# Patient Record
Sex: FEMALE | Race: WHITE | Employment: FULL TIME | URBAN - METROPOLITAN AREA
[De-identification: names, ages, dates, MRNs, and addresses within clinical notes are randomized per-mention and may not be internally consistent; named-entity substitution may affect disease eponyms.]

---
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
## 2022-02-21 ENCOUNTER — HOSPITAL ENCOUNTER (EMERGENCY)
Age: 22
Discharge: HOME | End: 2022-02-21
Attending: EMERGENCY MEDICINE
Payer: COMMERCIAL

## 2022-02-21 VITALS
BODY MASS INDEX: 47.65 KG/M2 | HEART RATE: 83 BPM | SYSTOLIC BLOOD PRESSURE: 195 MMHG | WEIGHT: 286 LBS | TEMPERATURE: 98.6 F | RESPIRATION RATE: 18 BRPM | OXYGEN SATURATION: 99 % | DIASTOLIC BLOOD PRESSURE: 108 MMHG | HEIGHT: 65 IN

## 2022-02-21 DIAGNOSIS — K08.89 PAIN, DENTAL: Primary | ICD-10-CM

## 2022-02-21 PROCEDURE — 96372 THER/PROPH/DIAG INJ SC/IM: CPT

## 2022-02-21 PROCEDURE — 6370000000 HC RX 637 (ALT 250 FOR IP): Performed by: EMERGENCY MEDICINE

## 2022-02-21 PROCEDURE — 6360000002 HC RX W HCPCS: Performed by: EMERGENCY MEDICINE

## 2022-02-21 PROCEDURE — 99284 EMERGENCY DEPT VISIT MOD MDM: CPT

## 2022-02-21 RX ORDER — CHLORHEXIDINE GLUCONATE 0.12 MG/ML
15 RINSE ORAL 2 TIMES DAILY
Qty: 420 ML | Refills: 0 | Status: SHIPPED | OUTPATIENT
Start: 2022-02-21 | End: 2022-03-07

## 2022-02-21 RX ORDER — PENICILLIN V POTASSIUM 500 MG/1
500 TABLET ORAL 4 TIMES DAILY
Qty: 28 TABLET | Refills: 0 | Status: SHIPPED | OUTPATIENT
Start: 2022-02-21 | End: 2022-02-28

## 2022-02-21 RX ORDER — NAPROXEN 500 MG/1
500 TABLET ORAL 2 TIMES DAILY WITH MEALS
Qty: 60 TABLET | Refills: 5 | Status: SHIPPED | OUTPATIENT
Start: 2022-02-21

## 2022-02-21 RX ORDER — KETOROLAC TROMETHAMINE 30 MG/ML
30 INJECTION, SOLUTION INTRAMUSCULAR; INTRAVENOUS ONCE
Status: COMPLETED | OUTPATIENT
Start: 2022-02-21 | End: 2022-02-21

## 2022-02-21 RX ORDER — PENICILLIN V POTASSIUM 250 MG/1
500 TABLET ORAL ONCE
Status: COMPLETED | OUTPATIENT
Start: 2022-02-21 | End: 2022-02-21

## 2022-02-21 RX ADMIN — KETOROLAC TROMETHAMINE 30 MG: 30 INJECTION, SOLUTION INTRAMUSCULAR at 04:54

## 2022-02-21 RX ADMIN — PENICILLIN V POTASSIUM 500 MG: 250 TABLET, FILM COATED ORAL at 04:54

## 2022-02-21 ASSESSMENT — PAIN - FUNCTIONAL ASSESSMENT: PAIN_FUNCTIONAL_ASSESSMENT: 0-10

## 2022-02-21 ASSESSMENT — PAIN DESCRIPTION - ORIENTATION: ORIENTATION: LEFT;LOWER

## 2022-02-21 ASSESSMENT — PAIN DESCRIPTION - DESCRIPTORS: DESCRIPTORS: SHARP;THROBBING

## 2022-02-21 ASSESSMENT — PAIN SCALES - GENERAL
PAINLEVEL_OUTOF10: 8
PAINLEVEL_OUTOF10: 8

## 2022-02-21 ASSESSMENT — PAIN DESCRIPTION - LOCATION: LOCATION: TEETH

## 2022-02-21 NOTE — ED NOTES
Discharge instructions given. Verbalized understanding. Denies further questions or concerns. A&OX4. Ambulates from ED with steady gait.       Christal Cabrera RN  02/21/22 0630

## 2022-02-23 ASSESSMENT — ENCOUNTER SYMPTOMS
FACIAL SWELLING: 0
TROUBLE SWALLOWING: 0
VOICE CHANGE: 0
SORE THROAT: 0

## 2022-02-23 NOTE — ED PROVIDER NOTES
[address] [facility]  ED  [facility]      Pt Name: Dg Hunter  MRN: [id number]  [place] 2000  Date ofevaluation: 2/21/2022  Provider: Viola Warren MD    CHIEF COMPLAINT       Chief Complaint   Patient presents with    Dental Pain     x 3 days. Left bottom molar broken x 1 year. Pain got worse 3 days ago. HISTORY OF PRESENT ILLNESS   (Location/Symptom, Timing/Onset,Context/Setting, Quality, Duration, Modifying Factors, Severity)  Note limiting factors. Dg Hunter is a 22 y.o. female  who  has a past medical history of Hypertension, MRSA (methicillin resistant staph aureus) culture positive, and Thyroid disease. who presents to the emergency department for evaluation of pain to the left posterior mandible. Patient reports she has a history of a previously fractured tooth. She states that she will intermittently get dental pain but has been having persistent pain for the past 3 days. Denies facial swelling fevers difficulties breathing or swallowing. Denies nausea or vomiting. Reports she is not currently following with a dentist.  She tried over-the-counter occasions without improvement. HPI    NursingNotes were reviewed. REVIEW OF SYSTEMS    (2-9 systems for level 4, 10 or more for level 5)     Review of Systems   Constitutional: Negative for fever. HENT: Positive for dental problem. Negative for congestion, facial swelling, sore throat, trouble swallowing and voice change. Cardiovascular: Negative for chest pain. Except as noted above the remainder of the review of systems was reviewed and negative.        PAST MEDICAL HISTORY     Past Medical History:   Diagnosis Date    Hypertension     MRSA (methicillin resistant staph aureus) culture positive 12/30/13    lt thigh abscess    Thyroid disease          SURGICALHISTORY       Past Surgical History:   Procedure Laterality Date    TONSILLECTOMY           CURRENT MEDICATIONS       Discharge Medication List as of 2/21/2022  4:58 AM               Patient has no known allergies. FAMILY HISTORY     History reviewed. No pertinent family history. SOCIAL HISTORY       Social History     Socioeconomic History    Marital status: Single     Spouse name: None    Number of children: None    Years of education: None    Highest education level: None   Occupational History    None   Tobacco Use    Smoking status: Never Smoker    Smokeless tobacco: Never Used   Vaping Use    Vaping Use: None   Substance and Sexual Activity    Alcohol use: No    Drug use: No    Sexual activity: Never   Other Topics Concern    None   Social History Narrative    None     Social Determinants of Health     Financial Resource Strain:     Difficulty of Paying Living Expenses: Not on file   Food Insecurity:     Worried About Running Out of Food in the Last Year: Not on file    Iliana of Food in the Last Year: Not on file   Transportation Needs:     Lack of Transportation (Medical): Not on file    Lack of Transportation (Non-Medical):  Not on file   Physical Activity:     Days of Exercise per Week: Not on file    Minutes of Exercise per Session: Not on file   Stress:     Feeling of Stress : Not on file   Social Connections:     Frequency of Communication with Friends and Family: Not on file    Frequency of Social Gatherings with Friends and Family: Not on file    Attends Latter day Services: Not on file    Active Member of [address] [place] [address] [employer] or Organizations: Not on file    Attends Club or Organization Meetings: Not on file    Marital Status: Not on file   Intimate Partner Violence:     Fear of Current or Ex-Partner: Not on file    Emotionally Abused: Not on file    Physically Abused: Not on file    Sexually Abused: Not on file   Housing Stability:     Unable to Pay for Housing in the Last Year: Not on file    Number of [place] in the Last Year: Not on file    Unstable Housing in the Last Year: Not on file SCREENINGS    Cherelle Coma Scale  Eye Opening: Spontaneous  Best Verbal Response: Oriented  Best Motor Response: Obeys commands  Cherelle Coma Scale Score: 15        PHYSICAL EXAM    (up to 7 for level 4, 8 or more for level 5)     ED Triage Vitals [02/21/22 0430]   BP Temp Temp Source Pulse Resp SpO2 Height Weight   (!) 195/108 98.6 °F (37 °C) Oral 83 18 99 % 5' 5\" (1.651 m) 286 lb (129.7 kg)       Physical Exam  Vitals and nursing note reviewed. Constitutional:       Appearance: She is well-developed. HENT:      Head: Normocephalic and atraumatic. Mouth/Throat:      Comments: Multiple decaying teeth without signs of abscess. No signs of tonsillar or peritonsillar swelling. No submandibular pain. Eyes:      Conjunctiva/sclera: Conjunctivae normal.      Pupils: Pupils are equal, round, and reactive to light. Neck:      Trachea: No tracheal deviation. Cardiovascular:      Rate and Rhythm: Normal rate and regular rhythm. Heart sounds: Normal heart sounds. Pulmonary:      Effort: Pulmonary effort is normal.      Breath sounds: Normal breath sounds. Abdominal:      General: There is no distension. Palpations: Abdomen is soft. Tenderness: There is no abdominal tenderness. Musculoskeletal:         General: Normal range of motion. Cervical back: Normal range of motion. Skin:     General: Skin is warm and dry. Capillary Refill: Capillary refill takes less than 2 seconds. Neurological:      Mental Status: She is alert and oriented to person, place, and time.          RESULTS     EKG: All EKG's are interpreted by the Emergency Department Physician who either signs or Co-signsthis chart in the absence of a cardiologist.      RADIOLOGY:   Non-plain filmimages such as CT, Ultrasound and MRI are read by the radiologist. Plain radiographic images are visualized and preliminarily interpreted by the emergency physician with the below findings:        Interpretation per the Radiologist below, if available at the time ofthis note:    No orders to display         ED BEDSIDE ULTRASOUND:   Performed by ED Physician - none    LABS:  Labs Reviewed - No data to display    All other labs were within normal range or not returned as of this dictation. EMERGENCY DEPARTMENT COURSE and DIFFERENTIAL DIAGNOSIS/MDM:   Vitals:    Vitals:    02/21/22 0430   BP: (!) 195/108   Pulse: 83   Resp: 18   Temp: 98.6 °F (37 °C)   TempSrc: Oral   SpO2: 99%   Weight: 286 lb (129.7 kg)   Height: 5' 5\" (1.651 m)       Patient was given thefollowing medications:  Medications   ketorolac (TORADOL) injection 30 mg (30 mg IntraMUSCular Given 2/21/22 0454)   penicillin v potassium (VEETID) tablet 500 mg (500 mg Oral Given 2/21/22 0454)       ED COURSE & MEDICAL DECISION MAKING    Pertinent Labs & Imaging studies reviewed. (See chart for details)   -  Patient seen and evaluated in the emergency department. -  Triage and nursing notes reviewed and incorporated. -  Old chart records reviewed and incorporated. -  Differential diagnosis includes: Differential Diagnosis: Dental Abscess, Airway Obstruction, Tom's Angina, Bacteremia/Sepsis. -  Work-up included:  See above  -  ED treatment included: See above  -  Results discussed with patient. Patient presents the ED for evaluation of dental pain. On exam no signs of abscess or facial swelling. No overlying erythema. No signs concerning for Tom angina or impending airway compromise. Patient provided with analgesics and antibiotics. Patient feels improved on reevaluation. Symptomatic treatment with expectant management discussed with the patient and they and/or family members present are amenable to treatment plan and outpatient follow-up. Strict return precautions were discussed with the patient and those present. They demonstrated understanding of when to return to the emergency department for new or worsening symptoms. .  The patient is agreeable with plan of care and disposition. REASSESSMENT          CRITICAL CARE TIME   Total Critical Care time was 0 minutes, excluding separately reportable procedures. There was a high probability of clinically significant/life threatening deterioration in the patient's condition which required my urgent intervention. CONSULTS:  None    PROCEDURES:  Unless otherwise noted below, none     Procedures    FINAL IMPRESSION      1. Pain, dental          DISPOSITION/PLAN   DISPOSITION Decision To Discharge 02/21/2022 04:58:52 AM      PATIENT REFERREDTO:  [address] [place],[place] [address]  Schedule an appointment as soon as possible for a visit today  As needed      DISCHARGEMEDICATIONS:  Discharge Medication List as of 2/21/2022  4:58 AM      START taking these medications    Details   naproxen (NAPROSYN) 500 MG tablet Take 1 tablet by mouth 2 times daily (with meals), Disp-60 tablet, R-5Print      chlorhexidine (PERIDEX) 0.12 % solution Take 15 mLs by mouth 2 times daily for 14 days, Disp-420 mL, R-0Print      benzocaine (ORAJEL) 10 % mucosal gel Take by mouth as needed. , Disp-9 g, R-0, Print      penicillin v potassium (VEETID) 500 MG tablet Take 1 tablet by mouth 4 times daily for 7 days, Disp-28 tablet, R-0Print                (Please note that portions of this note were completed with a voice recognition program.  Efforts were made to edit the dictations but occasionally words are mis-transcribed.)    Mina Benitez MD (electronically signed)  Attending Emergency Physician          Mina Benitez MD  02/23/22 6849

## 2022-03-16 ENCOUNTER — HOSPITAL ENCOUNTER (EMERGENCY)
Age: 22
Discharge: HOME | End: 2022-03-16
Attending: EMERGENCY MEDICINE
Payer: COMMERCIAL

## 2022-03-16 VITALS
BODY MASS INDEX: 47.82 KG/M2 | RESPIRATION RATE: 20 BRPM | TEMPERATURE: 98.9 F | WEIGHT: 287 LBS | OXYGEN SATURATION: 100 % | HEART RATE: 64 BPM | SYSTOLIC BLOOD PRESSURE: 180 MMHG | DIASTOLIC BLOOD PRESSURE: 90 MMHG | HEIGHT: 65 IN

## 2022-03-16 DIAGNOSIS — I10 UNCONTROLLED HYPERTENSION: ICD-10-CM

## 2022-03-16 DIAGNOSIS — K08.89 PAIN, DENTAL: Primary | ICD-10-CM

## 2022-03-16 PROCEDURE — 6370000000 HC RX 637 (ALT 250 FOR IP): Performed by: EMERGENCY MEDICINE

## 2022-03-16 PROCEDURE — 99283 EMERGENCY DEPT VISIT LOW MDM: CPT

## 2022-03-16 RX ORDER — LIDOCAINE HYDROCHLORIDE 20 MG/ML
15 SOLUTION OROPHARYNGEAL ONCE
Status: COMPLETED | OUTPATIENT
Start: 2022-03-16 | End: 2022-03-16

## 2022-03-16 RX ORDER — HYDROCODONE BITARTRATE AND ACETAMINOPHEN 5; 325 MG/1; MG/1
1 TABLET ORAL ONCE
Status: COMPLETED | OUTPATIENT
Start: 2022-03-16 | End: 2022-03-16

## 2022-03-16 RX ORDER — HYDROCODONE BITARTRATE AND ACETAMINOPHEN 5; 325 MG/1; MG/1
1 TABLET ORAL EVERY 6 HOURS PRN
Qty: 10 TABLET | Refills: 0 | Status: SHIPPED | OUTPATIENT
Start: 2022-03-16 | End: 2022-03-19

## 2022-03-16 RX ADMIN — HYDROCODONE BITARTRATE AND ACETAMINOPHEN 1 TABLET: 5; 325 TABLET ORAL at 03:02

## 2022-03-16 RX ADMIN — LIDOCAINE HYDROCHLORIDE 15 ML: 20 SOLUTION ORAL; TOPICAL at 03:02

## 2022-03-16 ASSESSMENT — PAIN - FUNCTIONAL ASSESSMENT: PAIN_FUNCTIONAL_ASSESSMENT: 0-10

## 2022-03-16 ASSESSMENT — ENCOUNTER SYMPTOMS
FACIAL SWELLING: 0
TROUBLE SWALLOWING: 0

## 2022-03-16 ASSESSMENT — PAIN SCALES - GENERAL: PAINLEVEL_OUTOF10: 7

## 2022-03-16 ASSESSMENT — PAIN DESCRIPTION - PAIN TYPE: TYPE: ACUTE PAIN

## 2022-03-16 ASSESSMENT — PAIN DESCRIPTION - LOCATION: LOCATION: TEETH

## 2022-03-16 NOTE — ED PROVIDER NOTES
[address] [facility]  ED  EMERGENCY DEPARTMENT ENCOUNTER      Pt Name: Dinh Waldron  MRN: [id number]  [place] 2000  Date of evaluation: 3/16/2022  Provider: Cory Serna MD    [address]       Chief Complaint   Patient presents with    Dental Pain     broken filling (6 months ago); pain 10/10; reports unable to sleep due to pain         HISTORY OF PRESENT ILLNESS   (Location/Symptom, Timing/Onset,Context/Setting, Quality, Duration, Modifying Factors, Severity)  Note limiting factors. Dinh Waldron is a 22 y.o. female who presents to the emergency department for dental pain, filling in tooth fell out. Went to dentist recently and they made it worse by chipping the tooth. Naproxen is not helping, took tylenol and that is also not helping, recently completed pcn in Feb.         Nursing notes were reviewed. REVIEW OF SYSTEMS    (2-9 systems for level 4, 10 or more for level 5)     Review of Systems   Constitutional: Negative for fever. HENT: Negative for facial swelling and trouble swallowing. PAST MEDICAL HISTORY     Past Medical History:   Diagnosis Date    Hypertension     MRSA (methicillin resistant staph aureus) culture positive 12/30/13    lt thigh abscess    Thyroid disease          SURGICALHISTORY       Past Surgical History:   Procedure Laterality Date    TONSILLECTOMY           CURRENT MEDICATIONS       Discharge Medication List as of 3/16/2022  3:02 AM      CONTINUE these medications which have NOT CHANGED    Details   naproxen (NAPROSYN) 500 MG tablet Take 1 tablet by mouth 2 times daily (with meals), Disp-60 tablet, R-5Print      benzocaine (ORAJEL) 10 % mucosal gel Take by mouth as needed. , Disp-9 g, R-0, Print             ALLERGIES     Patient has no known allergies. FAMILY HISTORY     History reviewed. No pertinent family history.        SOCIAL HISTORY       Social History     Socioeconomic History    Marital status: Single     Spouse name: None    Number of children: None    Years of education: None    Highest education level: None   Occupational History    None   Tobacco Use    Smoking status: Never Smoker    Smokeless tobacco: Never Used   Vaping Use    Vaping Use: None   Substance and Sexual Activity    Alcohol use: No    Drug use: No    Sexual activity: Never   Other Topics Concern    None   Social History Narrative    None     Social Determinants of Health     Financial Resource Strain:     Difficulty of Paying Living Expenses: Not on file   Food Insecurity:     Worried About Running Out of Food in the Last Year: Not on file    Iliana of Food in the Last Year: Not on file   Transportation Needs:     Lack of Transportation (Medical): Not on file    Lack of Transportation (Non-Medical):  Not on file   Physical Activity:     Days of Exercise per Week: Not on file    Minutes of Exercise per Session: Not on file   Stress:     Feeling of Stress : Not on file   Social Connections:     Frequency of Communication with Friends and Family: Not on file    Frequency of Social Gatherings with Friends and Family: Not on file    Attends Alevism Services: Not on file    Active Member of [address] [place] [address] or Organizations: Not on file    Attends Club or Organization Meetings: Not on file    Marital Status: Not on file   Intimate Partner Violence:     Fear of Current or Ex-Partner: Not on file    Emotionally Abused: Not on file    Physically Abused: Not on file    Sexually Abused: Not on file   Housing Stability:     Unable to Pay for Housing in the Last Year: Not on file    Number of [place] in the Last Year: Not on file    Unstable Housing in the Last Year: Not on file       SCREENINGS    [place] Coma Scale  Eye Opening: Spontaneous  Best Verbal Response: Oriented  Best Motor Response: Obeys commands  [place] Coma Scale Score: 15        PHYSICAL EXAM    (up to 7 for level 4, 8 or more for level 5)     ED Triage Vitals [03/16/22 0235]   BP Temp Temp src Pulse Resp SpO2 Height Weight   (!) 180/90 98.9 °F (37.2 °C) -- 64 20 100 % 5' 5\" (1.651 m) 287 lb (130.2 kg)       Physical Exam  Vitals and nursing note reviewed. Constitutional:       Appearance: Normal appearance. She is well-developed. She is not ill-appearing. HENT:      Head: Normocephalic and atraumatic. Right Ear: External ear normal.      Left Ear: External ear normal.      Nose: Nose normal.      Mouth/Throat:      Mouth: Mucous membranes are moist.      Comments: Significant plaque buildup with signs of gingivitis. The right lower jaw she has a missing tooth in the molar behind this appears with partial filling and a hole. No signs of surrounding infection. The area is tender. No acute focal collection. She has no swelling to the jaw or the neck. Her tongue is not elevated. Eyes:      General: No scleral icterus. Right eye: No discharge. Left eye: No discharge. Conjunctiva/sclera: Conjunctivae normal.   Cardiovascular:      Rate and Rhythm: Normal rate. Pulmonary:      Effort: Pulmonary effort is normal. No respiratory distress. Musculoskeletal:      Cervical back: Neck supple. Lymphadenopathy:      Cervical: No cervical adenopathy. Skin:     Coloration: Skin is not pale. Neurological:      Mental Status: She is alert.    Psychiatric:         Mood and Affect: Mood normal.         Behavior: Behavior normal.         DIAGNOSTIC RESULTS     EKG: All EKG's are interpreted by the Emergency Department Physician who either signs or Co-signs this chart in the absence of a cardiologist.    12 lead EKG shows     RADIOLOGY:   Non-plain film images such as CT, Ultrasound and MRI are read by the radiologist. Plain radiographic images are visualized and preliminarily interpreted by the emergency physician with the below findings:        Interpretation per the Radiologist below, if available at the time of this note:    No orders to display         ED BEDSIDE ULTRASOUND:   Performed by ED Physician - none    LABS:  Labs Reviewed - No data to display    All other labs were within normal range or not returned as of this dictation. EMERGENCY DEPARTMENT COURSE and DIFFERENTIAL DIAGNOSIS/MDM:   Vitals:    Vitals:    03/16/22 0235   BP: (!) 180/90   Pulse: 64   Resp: 20   Temp: 98.9 °F (37.2 °C)   SpO2: 100%   Weight: 287 lb (130.2 kg)   Height: 5' 5\" (1.651 m)       Adult female who comes in for dental pain. She does have a tooth it appears that to be broken. Patient is given Norco here and viscous lidocaine is applied topically to the gum. Patient's blood pressure is significantly and her chart is reviewed and this seems to be a trend. When I addresses the patient states that she has not taken blood pressure medicine for a while not because it was discontinued but she stopped seeing her primary care provider. She also has a history of hyperthyroidism and has not seen an endocrinologist.  Patient states that when she was on medication she felt depressed and suicidal she is not sure if it was because of the medicines or the prospect of being on medications for a long time. Patient is counseled I explained to her that the complications of untreated hyperthyroidism and hypertension in the long run would be more detrimental than being on medications. I recommend that she follows up with her primary care provider and a referral is given for new PCP in the event that she is unable to reestablish care. The patient expresses understanding. She has a dental appointment on Friday I encouraged her to keep that appointment. CRITICAL CARE TIME   None       CONSULTS:  None    PROCEDURES:       Procedures    FINAL IMPRESSION      1. Pain, dental    2.  Uncontrolled hypertension          DISPOSITION/PLAN   DISPOSITION Decision To Discharge 03/16/2022 03:01:44 AM      PATIENT REFERREDTO:  Denise Walker MD  [facility] [address]  [phone]    Schedule an appointment as soon as possible for a visit         Keep your appointment with your dentist on Friday        Katie Rahman DO  [address]  [phone]    Schedule an appointment as soon as possible for a visit   If unable to see your previous PCP      DISCHARGEMEDICATIONS:  Discharge Medication List as of 3/16/2022  3:02 AM      START taking these medications    Details   HYDROcodone-acetaminophen (NORCO) 5-325 MG per tablet Take 1 tablet by mouth every 6 hours as needed for Pain for up to 3 days. Intended supply: 3 days.  Take lowest dose possible to manage pain, Disp-10 tablet, R-0Print                (Please note that portions of this note were completed with a voice recognition program.  Efforts were made to edit the dictations but occasionally words are mis-transcribed.)    Magaly Crenshaw MD (electronically signed)  Attending Emergency Physician          Magaly Crenshaw MD  03/16/22 9311

## 2022-03-16 NOTE — ED NOTES
Discharge instructions explained. Patient verbalized understanding and denies any other concerns or complaints at this time. Patient vital signs stable and no acute signs or symptoms of distress noted at discharge. Patient deemed clinically stable. Patient d/c home with family.      Raman Riojas RN  03/16/22 0105

## 2022-03-16 NOTE — ED NOTES
Patient reports taking Tylenol PM prior to coming to ED and reports only made sleepy without pain relief.      Ethan Mcclain RN  03/16/22 7930

## 2022-08-01 ENCOUNTER — HOSPITAL ENCOUNTER (EMERGENCY)
Age: 22
Discharge: HOME | End: 2022-08-01
Attending: EMERGENCY MEDICINE
Payer: COMMERCIAL

## 2022-08-01 VITALS
SYSTOLIC BLOOD PRESSURE: 149 MMHG | RESPIRATION RATE: 18 BRPM | HEART RATE: 89 BPM | OXYGEN SATURATION: 100 % | DIASTOLIC BLOOD PRESSURE: 91 MMHG | TEMPERATURE: 97.8 F

## 2022-08-01 DIAGNOSIS — K08.89 PAIN, DENTAL: Primary | ICD-10-CM

## 2022-08-01 PROCEDURE — 6370000000 HC RX 637 (ALT 250 FOR IP): Performed by: EMERGENCY MEDICINE

## 2022-08-01 PROCEDURE — 99283 EMERGENCY DEPT VISIT LOW MDM: CPT

## 2022-08-01 RX ORDER — PENICILLIN V POTASSIUM 250 MG/1
500 TABLET ORAL ONCE
Status: COMPLETED | OUTPATIENT
Start: 2022-08-01 | End: 2022-08-01

## 2022-08-01 RX ORDER — LISINOPRIL 5 MG/1
5 TABLET ORAL DAILY
COMMUNITY

## 2022-08-01 RX ORDER — ACETAMINOPHEN 500 MG
1000 TABLET ORAL ONCE
Status: COMPLETED | OUTPATIENT
Start: 2022-08-01 | End: 2022-08-01

## 2022-08-01 RX ORDER — PENICILLIN V POTASSIUM 500 MG/1
500 TABLET ORAL 4 TIMES DAILY
Qty: 28 TABLET | Refills: 0 | Status: SHIPPED | OUTPATIENT
Start: 2022-08-01 | End: 2022-08-08

## 2022-08-01 RX ORDER — LEVOTHYROXINE SODIUM 0.2 MG/1
200 TABLET ORAL DAILY
COMMUNITY
Start: 2022-07-18

## 2022-08-01 RX ADMIN — ACETAMINOPHEN 1000 MG: 500 TABLET ORAL at 05:56

## 2022-08-01 RX ADMIN — PENICILLIN V POTASSIUM 500 MG: 250 TABLET, FILM COATED ORAL at 05:56

## 2022-08-01 ASSESSMENT — PAIN SCALES - GENERAL: PAINLEVEL_OUTOF10: 6

## 2022-08-01 NOTE — ED PROVIDER NOTES
Emergency Department Attending Note    Cande Mg MD    Date of ED VIsit: 8/1/2022    CHIEF COMPLAINT  Dental Pain (Patient c/o dental pain x5 days. Patient states that she has a broken tooth with an exposed nerve root. )      HISTORY OF PRESENT ILLNESS  Donell Roblero is a 25 y.o. female  With Vital signs of BP (!) 149/91   Pulse 89   Temp 97.8 °F (36.6 °C) (Oral)   Resp 18   SpO2 100%  who presents to the ED with a complaint of toe pain x5 days. Patient seen and evaluated in room 7. Patient is complaining of right mandibular molar dental pain for 5 days. She states is gotten worse over the the past 2 days Kasie Mccormick prompted her to come into the emergency department for evaluation. Furthermore she has never had her wisdom teeth removed and apparently her dentist said that she had to go to the hospital to get her teeth removed which did not make any sense to me other than to see an oral maxillofacial specialist for the tooth removal.  No fevers. She has pain with eating. .  No other complaints, modifying factors or associated symptoms. Patients Past medical history reviewed and listed below  Past Medical History:   Diagnosis Date    Hypertension     MRSA (methicillin resistant staph aureus) culture positive 12/30/13    lt thigh abscess    Thyroid disease      Past Surgical History:   Procedure Laterality Date    TONSILLECTOMY         I have reviewed the following from the nursing documentation. History reviewed. No pertinent family history.   Social History     Socioeconomic History    Marital status: Single     Spouse name: Not on file    Number of children: Not on file    Years of education: Not on file    Highest education level: Not on file   Occupational History    Not on file   Tobacco Use    Smoking status: Never    Smokeless tobacco: Never   Vaping Use    Vaping Use: Not on file   Substance and Sexual Activity    Alcohol use: No    Drug use: No    Sexual activity: Never   Other Topics Concern    Not on file   Social History Narrative    Not on file     Social Determinants of Health     Financial Resource Strain: Not on file   Food Insecurity: Not on file   Transportation Needs: Not on file   Physical Activity: Not on file   Stress: Not on file   Social Connections: Not on file   Intimate Partner Violence: Not on file   Housing Stability: Not on file     No current facility-administered medications for this encounter. Current Outpatient Medications   Medication Sig Dispense Refill    levothyroxine (SYNTHROID) 200 MCG tablet Take 200 mcg by mouth in the morning. lisinopril (PRINIVIL;ZESTRIL) 5 MG tablet Take 5 mg by mouth in the morning. naproxen (NAPROSYN) 500 MG tablet Take 1 tablet by mouth 2 times daily (with meals) 60 tablet 5    benzocaine (ORAJEL) 10 % mucosal gel Take by mouth as needed. 9 g 0     No Known Allergies    REVIEW OF SYSTEMS  10 systems reviewed, pertinent positives per HPI otherwise noted to be negative     PHYSICAL EXAM  BP (!) 149/91   Pulse 89   Temp 97.8 °F (36.6 °C) (Oral)   Resp 18   SpO2 100%   GENERAL APPEARANCE: Awake and alert. Cooperative. In no obvious distress. HEAD: Normocephalic. Atraumatic. EYES: PERRL. EOM's grossly intact. ENT: Mucous membranes are pink and moist.  No intraoral abscess, poor dentition. NECK: Supple. HEART: RRR. No murmurs. LUNGS: Respirations unlabored. CTAB. Good air exchange. ABDOMEN: Soft. Non-distended. Non-tender. No masses. No organomegaly. No guarding or rebound. EXTREMITIES: No peripheral edema. Moves all extremities equally. All extremities neurovascularly intact. SKIN: Warm and dry. No acute rashes. NEUROLOGICAL: Alert and oriented. Strength 5/5, sensation intact. Gait normal.   PSYCHIATRIC: Normal mood and affect. No HI or SI expressed to me.       RADIOLOGY    If acquired see below     EKG:     If acquired see below       ED COURSE/MDM    So the patient will be started on Pen-Vee K and given a prescription and told to follow-up with a dentist or with an oral maxillofacial surgeon to remedy the wisdom tooth problem which is I think the primary cause of her pain         The ED course and plan were reviewed and results discussed with the patient    The patient understood and agreed with the Discharge/transfer planning.     CLINICAL IMPRESSION and DISPOSITION    Bessie Islas was stable and diagnosed with dental pain    Patient was treated with Ricardo Belle MD  08/01/22 5731

## 2022-08-01 NOTE — DISCHARGE INSTRUCTIONS
You need to have those wisdom teeth taken care of by an oral maxillofacial surgeon. That will help with your pain. In the meantime we will give you an antibiotic to fight any infection and possibly reduce the pain as well. Make sure you complete the antibiotic prescribed.   And if the pain stops after that please make sure he is still go on to the dentist or the oral maxillofacial surgeon because it will only come back

## 2024-09-30 ENCOUNTER — APPOINTMENT (OUTPATIENT)
Dept: CT IMAGING | Age: 24
End: 2024-09-30

## 2024-09-30 ENCOUNTER — HOSPITAL ENCOUNTER (EMERGENCY)
Age: 24
Discharge: ANOTHER HOSPITAL | End: 2024-09-30
Attending: STUDENT IN AN ORGANIZED HEALTH CARE EDUCATION/TRAINING PROGRAM

## 2024-09-30 VITALS
HEIGHT: 65 IN | HEART RATE: 85 BPM | WEIGHT: 293 LBS | BODY MASS INDEX: 48.82 KG/M2 | DIASTOLIC BLOOD PRESSURE: 97 MMHG | SYSTOLIC BLOOD PRESSURE: 154 MMHG | RESPIRATION RATE: 16 BRPM | OXYGEN SATURATION: 96 % | TEMPERATURE: 98.7 F

## 2024-09-30 DIAGNOSIS — K08.89 PAIN, DENTAL: Primary | ICD-10-CM

## 2024-09-30 DIAGNOSIS — J38.7: ICD-10-CM

## 2024-09-30 LAB
ALBUMIN SERPL-MCNC: 4.1 G/DL (ref 3.4–5)
ALBUMIN/GLOB SERPL: 1.3 {RATIO} (ref 1.1–2.2)
ALP SERPL-CCNC: 71 U/L (ref 40–129)
ALT SERPL-CCNC: 14 U/L (ref 10–40)
ANION GAP SERPL CALCULATED.3IONS-SCNC: 9 MMOL/L (ref 3–16)
AST SERPL-CCNC: 11 U/L (ref 15–37)
BASOPHILS # BLD: 0 K/UL (ref 0–0.2)
BASOPHILS NFR BLD: 0.4 %
BILIRUB SERPL-MCNC: 0.4 MG/DL (ref 0–1)
BUN SERPL-MCNC: 9 MG/DL (ref 7–20)
CALCIUM SERPL-MCNC: 8.6 MG/DL (ref 8.3–10.6)
CHLORIDE SERPL-SCNC: 99 MMOL/L (ref 99–110)
CO2 SERPL-SCNC: 26 MMOL/L (ref 21–32)
CREAT SERPL-MCNC: 0.6 MG/DL (ref 0.6–1.1)
DEPRECATED RDW RBC AUTO: 15.1 % (ref 12.4–15.4)
EOSINOPHIL # BLD: 0 K/UL (ref 0–0.6)
EOSINOPHIL NFR BLD: 0.5 %
GFR SERPLBLD CREATININE-BSD FMLA CKD-EPI: >90 ML/MIN/{1.73_M2}
GLUCOSE SERPL-MCNC: 136 MG/DL (ref 70–99)
HCG SERPL QL: NEGATIVE
HCT VFR BLD AUTO: 37.5 % (ref 36–48)
HGB BLD-MCNC: 12.3 G/DL (ref 12–16)
LYMPHOCYTES # BLD: 1.4 K/UL (ref 1–5.1)
LYMPHOCYTES NFR BLD: 14.8 %
MCH RBC QN AUTO: 25.2 PG (ref 26–34)
MCHC RBC AUTO-ENTMCNC: 32.8 G/DL (ref 31–36)
MCV RBC AUTO: 76.6 FL (ref 80–100)
MONOCYTES # BLD: 0.4 K/UL (ref 0–1.3)
MONOCYTES NFR BLD: 4.5 %
NEUTROPHILS # BLD: 7.6 K/UL (ref 1.7–7.7)
NEUTROPHILS NFR BLD: 79.8 %
PLATELET # BLD AUTO: 287 K/UL (ref 135–450)
PMV BLD AUTO: 8 FL (ref 5–10.5)
POTASSIUM SERPL-SCNC: 3.7 MMOL/L (ref 3.5–5.1)
PROT SERPL-MCNC: 7.3 G/DL (ref 6.4–8.2)
RBC # BLD AUTO: 4.89 M/UL (ref 4–5.2)
S PYO AG THROAT QL: NEGATIVE
SODIUM SERPL-SCNC: 134 MMOL/L (ref 136–145)
WBC # BLD AUTO: 9.5 K/UL (ref 4–11)

## 2024-09-30 PROCEDURE — 6370000000 HC RX 637 (ALT 250 FOR IP): Performed by: STUDENT IN AN ORGANIZED HEALTH CARE EDUCATION/TRAINING PROGRAM

## 2024-09-30 PROCEDURE — 70487 CT MAXILLOFACIAL W/DYE: CPT

## 2024-09-30 PROCEDURE — 96372 THER/PROPH/DIAG INJ SC/IM: CPT

## 2024-09-30 PROCEDURE — 99285 EMERGENCY DEPT VISIT HI MDM: CPT

## 2024-09-30 PROCEDURE — 85025 COMPLETE CBC W/AUTO DIFF WBC: CPT

## 2024-09-30 PROCEDURE — 87040 BLOOD CULTURE FOR BACTERIA: CPT

## 2024-09-30 PROCEDURE — 87880 STREP A ASSAY W/OPTIC: CPT

## 2024-09-30 PROCEDURE — 64400 NJX AA&/STRD TRIGEMINAL NRV: CPT

## 2024-09-30 PROCEDURE — 36415 COLL VENOUS BLD VENIPUNCTURE: CPT

## 2024-09-30 PROCEDURE — 6360000004 HC RX CONTRAST MEDICATION: Performed by: STUDENT IN AN ORGANIZED HEALTH CARE EDUCATION/TRAINING PROGRAM

## 2024-09-30 PROCEDURE — 84703 CHORIONIC GONADOTROPIN ASSAY: CPT

## 2024-09-30 PROCEDURE — 2580000003 HC RX 258: Performed by: STUDENT IN AN ORGANIZED HEALTH CARE EDUCATION/TRAINING PROGRAM

## 2024-09-30 PROCEDURE — 6360000002 HC RX W HCPCS: Performed by: STUDENT IN AN ORGANIZED HEALTH CARE EDUCATION/TRAINING PROGRAM

## 2024-09-30 PROCEDURE — 96365 THER/PROPH/DIAG IV INF INIT: CPT

## 2024-09-30 PROCEDURE — 80053 COMPREHEN METABOLIC PANEL: CPT

## 2024-09-30 RX ORDER — KETOROLAC TROMETHAMINE 15 MG/ML
15 INJECTION, SOLUTION INTRAMUSCULAR; INTRAVENOUS ONCE
Status: COMPLETED | OUTPATIENT
Start: 2024-09-30 | End: 2024-09-30

## 2024-09-30 RX ORDER — OXYCODONE AND ACETAMINOPHEN 5; 325 MG/1; MG/1
1 TABLET ORAL ONCE
Status: COMPLETED | OUTPATIENT
Start: 2024-09-30 | End: 2024-09-30

## 2024-09-30 RX ORDER — IOPAMIDOL 755 MG/ML
75 INJECTION, SOLUTION INTRAVASCULAR
Status: COMPLETED | OUTPATIENT
Start: 2024-09-30 | End: 2024-09-30

## 2024-09-30 RX ORDER — SODIUM CHLORIDE, SODIUM LACTATE, POTASSIUM CHLORIDE, AND CALCIUM CHLORIDE .6; .31; .03; .02 G/100ML; G/100ML; G/100ML; G/100ML
1000 INJECTION, SOLUTION INTRAVENOUS ONCE
Status: COMPLETED | OUTPATIENT
Start: 2024-09-30 | End: 2024-09-30

## 2024-09-30 RX ORDER — DEXAMETHASONE 4 MG/1
6 TABLET ORAL ONCE
Status: COMPLETED | OUTPATIENT
Start: 2024-09-30 | End: 2024-09-30

## 2024-09-30 RX ORDER — PENICILLIN V POTASSIUM 500 MG/1
500 TABLET, FILM COATED ORAL 4 TIMES DAILY
Qty: 28 TABLET | Refills: 0 | Status: SHIPPED | OUTPATIENT
Start: 2024-09-30 | End: 2024-10-07

## 2024-09-30 RX ORDER — PENICILLIN V POTASSIUM 250 MG/1
500 TABLET, FILM COATED ORAL ONCE
Status: COMPLETED | OUTPATIENT
Start: 2024-09-30 | End: 2024-09-30

## 2024-09-30 RX ADMIN — AMPICILLIN SODIUM AND SULBACTAM SODIUM 3000 MG: 2; 1 INJECTION, POWDER, FOR SOLUTION INTRAMUSCULAR; INTRAVENOUS at 07:54

## 2024-09-30 RX ADMIN — SODIUM CHLORIDE, POTASSIUM CHLORIDE, SODIUM LACTATE AND CALCIUM CHLORIDE 1000 ML: 600; 310; 30; 20 INJECTION, SOLUTION INTRAVENOUS at 08:26

## 2024-09-30 RX ADMIN — PENICILLIN V POTASSIUM 500 MG: 250 TABLET, FILM COATED ORAL at 04:16

## 2024-09-30 RX ADMIN — DEXAMETHASONE 6 MG: 4 TABLET ORAL at 04:17

## 2024-09-30 RX ADMIN — KETOROLAC TROMETHAMINE 15 MG: 15 INJECTION, SOLUTION INTRAMUSCULAR; INTRAVENOUS at 04:18

## 2024-09-30 RX ADMIN — OXYCODONE HYDROCHLORIDE AND ACETAMINOPHEN 1 TABLET: 5; 325 TABLET ORAL at 04:17

## 2024-09-30 RX ADMIN — IOPAMIDOL 75 ML: 755 INJECTION, SOLUTION INTRAVENOUS at 06:56

## 2024-09-30 ASSESSMENT — PAIN SCALES - GENERAL: PAINLEVEL_OUTOF10: 6

## 2024-09-30 ASSESSMENT — LIFESTYLE VARIABLES
HOW OFTEN DO YOU HAVE A DRINK CONTAINING ALCOHOL: NEVER
HOW MANY STANDARD DRINKS CONTAINING ALCOHOL DO YOU HAVE ON A TYPICAL DAY: PATIENT DOES NOT DRINK

## 2024-09-30 ASSESSMENT — PAIN DESCRIPTION - FREQUENCY: FREQUENCY: CONTINUOUS

## 2024-09-30 ASSESSMENT — PAIN DESCRIPTION - PAIN TYPE: TYPE: ACUTE PAIN

## 2024-09-30 ASSESSMENT — PAIN DESCRIPTION - ORIENTATION: ORIENTATION: LEFT

## 2024-09-30 ASSESSMENT — PAIN DESCRIPTION - ONSET: ONSET: ON-GOING

## 2024-09-30 ASSESSMENT — PAIN - FUNCTIONAL ASSESSMENT
PAIN_FUNCTIONAL_ASSESSMENT: ACTIVITIES ARE NOT PREVENTED
PAIN_FUNCTIONAL_ASSESSMENT: 0-10

## 2024-09-30 ASSESSMENT — PAIN DESCRIPTION - DESCRIPTORS: DESCRIPTORS: THROBBING

## 2024-09-30 ASSESSMENT — PAIN DESCRIPTION - LOCATION: LOCATION: TEETH

## 2024-09-30 NOTE — ED PROVIDER NOTES
[facility] ED     EMERGENCY DEPARTMENT ENCOUNTER         Pt Name: Bessie Islas   MRN: [id number]   Birthdate 2000   Date of evaluation: 9/30/2024   Provider: Anand Messina MD   PCP: Bauman, Anya Carrel, MD   Note Started: 3:57 AM EDT 9/30/24       Chief Complaint     Dental Problem (Pt arrives with c/o dental problem to left lower teeth that started approx 1 week ago. Pt states she has noticed swelling over the last 2 days and states this is making it harder to breath and swallow.)      History of Present Illness     Bessie Islas is a 24 y.o. female who presents with 1 week of progressive dental pain associated with some deteriorating molars on the left mandible planning for dental intervention in the next week or so now complicated tonight by sore throat and difficulty opening her mouth.  Given this progression the patient feels like it is difficult to breathe.  She also reports some pharyngitis with painful swallowing.  Despite her symptoms she has had no fever or chills.  She is maintaining adequate hydration.  She has no difficulty speaking, she is managing her oral secretions has no other acute complaints      I have reviewed the nursing notes and agree unless otherwise noted.    Review of Systems     Positives and pertinent negatives as per HPI.    Past Medical, Surgical, Family, and Social History     She has a past medical history of Hypertension, MRSA (methicillin resistant staph aureus) culture positive, and Thyroid disease.  She has a past surgical history that includes Tonsillectomy.  Her family history is not on file.  She reports that she has never smoked. She has never used smokeless tobacco. She reports that she does not drink alcohol and does not use drugs.    SCREENINGS:          [place] Coma Scale  Eye Opening: Spontaneous  Best Verbal Response: Oriented  Best Motor Response: Obeys commands  [place] Coma Scale Score: 15                        ANS[place]WA

## 2024-09-30 NOTE — ED PROVIDER NOTES
Pierce of Care Note:    I assumed care of this patient at 08:00 from Dr. Messina, please see his note for more detail. Briefly, this pt is a 24-year-old female who presents with 2 days of discomfort in her lower dental and throat area and mild difficulty breathing but no roberto respiratory distress.  Laboratory evaluation was unremarkable.  CT imaging was performed and showed a 4.1 cm mixed solid and cystic mass arising from the tongue as well as signs of sialoadenitis.  There is some clinical concern for a dental abscess given poor dentition on exam and young age of patient.  Dr. Messina consulted ENT here at [facility] and spoke to Ciara Floyd who recommended reaching out to [facility] maxillofacial surgery for possible transfer to .  At time of signout plan is to consult  for possible transfer.    I have spoken to Dr. Bowers with maxillofacial surgery at [facility] and subsequently Dr. Joaquin Pan emergency medicine physician at [facility] the patient is transferred to [facility] emergency department for evaluation of mass with possible infection.  Patient expresses understanding and agreement with this plan.  Patient is monitored for any signs of airway compromise and is not develop any.  Patient is transferred for further care.      FINAL IMPRESSION      1. Pain, dental    2. Supraglottic abscess          DISPOSITION/PLAN   DISPOSITION Decision To Transfer 09/30/2024 07:04:02 AM  Condition at Disposition: Data Unavailable       Juan Francois MD  09/30/24 2268

## 2024-09-30 NOTE — ED NOTES
1 iv access, LR and unasyn compatibility status unknown in trissels. Abx started first.   
Dr. Floyd called back and spoke to Dr. Messina at 0069   
Dr. Francois spoke to O[facility] at  Dr. Pichardo who wanted the Patient to  ER. Dr. Pan Er doctor is the accepting who also spoke to Dr. Francois. Patient is [facility] since she has no insurance and access center is aware.   
Had CT pushed to UC.   
Report given to CMT  
Report given to [facility] transport chad. Report called to Zora QURESHI at [facility] ED  
[facility] ETA 1000. Patient going ED to Ed. Report# [phone]  
Stable

## 2024-10-04 LAB
BACTERIA BLD CULT ORG #2: NORMAL
BACTERIA BLD CULT: NORMAL